# Patient Record
Sex: FEMALE | Race: WHITE | Employment: UNEMPLOYED | URBAN - METROPOLITAN AREA
[De-identification: names, ages, dates, MRNs, and addresses within clinical notes are randomized per-mention and may not be internally consistent; named-entity substitution may affect disease eponyms.]

---

## 2017-02-03 ENCOUNTER — OFFICE VISIT (OUTPATIENT)
Dept: OBGYN CLINIC | Age: 22
End: 2017-02-03

## 2017-02-03 NOTE — PROGRESS NOTES
A user error has taken place: encounter opened in error, closed for administrative reasons. Patient rescheduled appointment due to scheduling AE to early. Last AE done: 8/2016. Patient told to call insurance to ensure early exam would be covered.

## 2017-08-15 ENCOUNTER — OFFICE VISIT (OUTPATIENT)
Dept: OBGYN CLINIC | Age: 22
End: 2017-08-15

## 2017-08-15 VITALS
SYSTOLIC BLOOD PRESSURE: 116 MMHG | BODY MASS INDEX: 29.59 KG/M2 | HEIGHT: 63 IN | DIASTOLIC BLOOD PRESSURE: 74 MMHG | WEIGHT: 167 LBS

## 2017-08-15 DIAGNOSIS — N94.6 DYSMENORRHEA: ICD-10-CM

## 2017-08-15 DIAGNOSIS — Z20.2 STD EXPOSURE: ICD-10-CM

## 2017-08-15 DIAGNOSIS — Z01.411 ENCOUNTER FOR GYNECOLOGICAL EXAMINATION (GENERAL) (ROUTINE) WITH ABNORMAL FINDINGS: Primary | ICD-10-CM

## 2017-08-15 DIAGNOSIS — N92.6 IRREGULAR MENSES: ICD-10-CM

## 2017-08-15 RX ORDER — NORETHINDRONE ACETATE AND ETHINYL ESTRADIOL 1MG-20(21)
1 KIT ORAL DAILY
Qty: 3 PACKAGE | Refills: 4 | Status: SHIPPED | OUTPATIENT
Start: 2017-08-15 | End: 2018-08-16 | Stop reason: SDUPTHER

## 2017-08-15 NOTE — MR AVS SNAPSHOT
Visit Information Date & Time Provider Department Dept. Phone Encounter #  
 8/15/2017  9:30 AM Ritesh Ochoa MD [place] [phone] [id number] Upcoming Health Maintenance Date Due  
 HPV AGE 9Y-34Y (1 of 3 - Female 3 Dose Series) 3/10/2007 INFLUENZA AGE 9 TO ADULT 8/1/2017 PAP AKA CERVICAL CYTOLOGY 8/12/2018 Allergies as of 8/15/2017  Review Complete On: 8/15/2017 By: Fransico Lackey LPN No Known Allergies Current Immunizations  Never Reviewed No immunizations on file. Not reviewed this visit Vitals BP Height(growth percentile) Weight(growth percentile) LMP BMI OB Status 116/74 5' 3\" (1.6 m) 167 lb (75.8 kg) 07/26/2017 (Exact Date) 29.58 kg/m2 Having regular periods Smoking Status Never Smoker BMI and BSA Data Body Mass Index Body Surface Area  
 29.58 kg/m 2 1.84 m 2 Preferred Pharmacy Pharmacy Name Phone [facility] [phone] [facility] [phone] Your Updated Medication List  
  
   
This list is accurate as of: 8/15/17  9:56 AM.  Always use your most recent med list.  
  
  
  
  
 ISONIAZID PO Take  by mouth. * norethindrone-ethinyl estradiol 1 mg-20 mcg (21)/75 mg (7) Tab Commonly known as:  Beatris Ku FE 1/20 Take 1 Tab by mouth daily. * MICROGESTIN FE 1/20 (28) 1 mg-20 mcg (21)/75 mg (7) Tab Generic drug:  norethindrone-ethinyl estradiol TAKE 1 TABLET BY MOUTH EVERY DAY  
  
 * Notice: This list has 2 medication(s) that are the same as other medications prescribed for you. Read the directions carefully, and ask your doctor or other care provider to review them with you. Patient Instructions Well Visit, Ages 25 to 48: Care Instructions Your Care Instructions Physical exams can help you stay healthy.  Your doctor has checked your overall health and may have suggested ways to take good care of yourself. He or she also may have recommended tests. At home, you can help prevent illness with healthy eating, regular exercise, and other steps. Follow-up care is a key part of your treatment and safety. Be sure to make and go to all appointments, and call your doctor if you are having problems. It's also a good idea to know your test results and keep a list of the medicines you take. How can you care for yourself at home? · Reach and stay at a healthy weight. This will lower your risk for many problems, such as obesity, diabetes, heart disease, and high blood pressure. · Get at least 30 minutes of physical activity on most days of the week. Walking is a good choice. You also may want to do other activities, such as running, swimming, cycling, or playing tennis or team sports. Discuss any changes in your exercise program with your doctor. · Do not smoke or allow others to smoke around you. If you need help quitting, talk to your doctor about stop-smoking programs and medicines. These can increase your chances of quitting for good. · Talk to your doctor about whether you have any risk factors for sexually transmitted infections (STIs). Having one sex partner (who does not have STIs and does not have sex with anyone else) is a good way to avoid these infections. · Use birth control if you do not want to have children at this time. Talk with your doctor about the choices available and what might be best for you. · Protect your skin from too much sun. When you're outdoors from 10 a.m. to 4 p.m., stay in the shade or cover up with clothing and a hat with a wide brim. Wear sunglasses that block UV rays. Even when it's cloudy, put broad-spectrum sunscreen (SPF 30 or higher) on any exposed skin. · See a dentist one or two times a year for checkups and to have your teeth cleaned. · Wear a seat belt in the car. · Drink alcohol in moderation, if at all. That means no more than 2 drinks a day for men and 1 drink a day for women. Follow your doctor's advice about when to have certain tests. These tests can spot problems early. For everyone · Cholesterol. Have the fat (cholesterol) in your blood tested after age 21. Your doctor will tell you how often to have this done based on your age, family history, or other things that can increase your risk for heart disease. · Blood pressure. Have your blood pressure checked during a routine doctor visit. Your doctor will tell you how often to check your blood pressure based on your age, your blood pressure results, and other factors. · Vision. Talk with your doctor about how often to have a glaucoma test. 
· Diabetes. Ask your doctor whether you should have tests for diabetes. · Colon cancer. Have a test for colon cancer at age 48. You may have one of several tests. If you are younger than 48, you may need a test earlier if you have any risk factors. Risk factors include whether you already had a precancerous polyp removed from your colon or whether your parent, brother, sister, or child has had colon cancer. For women · Breast exam and mammogram. Talk to your doctor about when you should have a clinical breast exam and a mammogram. Medical experts differ on whether and how often women under 50 should have these tests. Your doctor can help you decide what is right for you. · Pap test and pelvic exam. Begin Pap tests at age 24. A Pap test is the best way to find cervical cancer. The test often is part of a pelvic exam. Ask how often to have this test. 
· Tests for sexually transmitted infections (STIs). Ask whether you should have tests for STIs. You may be at risk if you have sex with more than one person, especially if your partners do not wear condoms. For men · Tests for sexually transmitted infections (STIs).  Ask whether you should have tests for STIs. You may be at risk if you have sex with more than one person, especially if you do not wear a condom. · Testicular cancer exam. Ask your doctor whether you should check your testicles regularly. · Prostate exam. Talk to your doctor about whether you should have a blood test (called a PSA test) for prostate cancer. Experts differ on whether and when men should have this test. Some experts suggest it if you are older than 39 and are -American or have a father or brother who got prostate cancer when he was younger than 72. When should you call for help? Watch closely for changes in your health, and be sure to contact your doctor if you have any problems or symptoms that concern you. Where can you learn more? Go to http://marissa-annabel.info/. Enter P072 in the search box to learn more about \"Well Visit, Ages 25 to 48: Care Instructions. \" Current as of: July 19, 2016 Content Version: 11.3 © 6231-7686 [employer]. Care instructions adapted under license by [employer] (which disclaims liability or warranty for this information). If you have questions about a medical condition or this instruction, always ask your healthcare professional. [place] [address] any warranty or liability for your use of this information. Introducing [facility]! Dear Elayne Peguero: Thank you for requesting a [facility] account. Our records indicate that you already have an active [facility] account. You can access your account anytime at [email or web address] Did you know that you can access your hospital and ER discharge instructions at any time in [facility]? You can also review all of your test results from your hospital stay or ER visit. Additional Information If you have questions, please visit the Frequently Asked Questions section of the [facility] website at [email or web address] Remember, [employer] is NOT to be used for urgent needs. For medical emergencies, dial 911. Now available from your iPhone and Android! Please provide this summary of care documentation to your next provider. Your primary care clinician is listed as Christina Feliciano. If you have any questions after today's visit, please call [phone].

## 2017-08-16 LAB
HBV SURFACE AG SERPL QL IA: NEGATIVE
HIV 1+2 AB+HIV1 P24 AG SERPL QL IA: NON REACTIVE
RPR SER QL: NON REACTIVE

## 2017-08-18 LAB
C TRACH RRNA SPEC QL NAA+PROBE: NEGATIVE
N GONORRHOEA RRNA SPEC QL NAA+PROBE: NEGATIVE
T VAGINALIS RRNA SPEC QL NAA+PROBE: NEGATIVE

## 2017-08-19 LAB
CYTOLOGIST CVX/VAG CYTO: ABNORMAL
CYTOLOGY CVX/VAG DOC THIN PREP: ABNORMAL
DX ICD CODE: ABNORMAL
DX ICD CODE: ABNORMAL
HPV I/H RISK 1 DNA CVX QL PROBE+SIG AMP: NEGATIVE
LABCORP, 190119: ABNORMAL
Lab: ABNORMAL
OTHER STN SPEC: ABNORMAL
PATH REPORT.FINAL DX SPEC: ABNORMAL
PATHOLOGIST CVX/VAG CYTO: ABNORMAL
STAT OF ADQ CVX/VAG CYTO-IMP: ABNORMAL

## 2017-08-28 NOTE — PROGRESS NOTES
Pap smear results showed ASCUS with HR HPV negative. There were also changes suggestive of HSV: rec RTC for HSV swab and blood testing to confirm/evaluate. Please schedule. Update chart/last pap. Per ASCCP guidelines, recommend repeat pap with HR HPV 3 years. Tickle. Notify patient: some low level changes on pap (can be caused by inflammation, infection, hormonal changes, etc...) but low risks for cervical cancer so rec repeat 3 years as above. If pt is smoking, rec stop smoking to decrease risk of cervical cancer.

## 2017-08-28 NOTE — PROGRESS NOTES
Patient advised of MD reviewed lab work and recommendations. Patient reports no symptoms of HSV. Patient placed on the schedule for the follow up HSV swab and blood testing on 9/15/16 at 10:20am     Patient verbalized understanding.

## 2017-09-15 ENCOUNTER — OFFICE VISIT (OUTPATIENT)
Dept: OBGYN CLINIC | Age: 22
End: 2017-09-15

## 2017-09-15 VITALS
HEIGHT: 63 IN | WEIGHT: 168 LBS | BODY MASS INDEX: 29.77 KG/M2 | DIASTOLIC BLOOD PRESSURE: 74 MMHG | SYSTOLIC BLOOD PRESSURE: 118 MMHG

## 2017-09-15 DIAGNOSIS — L98.9 SKIN LESION: ICD-10-CM

## 2017-09-15 DIAGNOSIS — Z20.2 STD EXPOSURE: Primary | ICD-10-CM

## 2017-09-15 RX ORDER — VALACYCLOVIR HYDROCHLORIDE 1 G/1
TABLET, FILM COATED ORAL
Refills: 0 | COMMUNITY
Start: 2017-09-12 | End: 2018-08-16 | Stop reason: SDUPTHER

## 2017-09-15 NOTE — MR AVS SNAPSHOT
Visit Information Date & Time Provider Department Dept. Phone Encounter #  
 9/15/2017 10:20 AM Joselyn Kussmaul, MD [place] [phone] [id number] Your Appointments 8/16/2018 10:20 AM  
ESTABLISHED PATIENT with Joselyn Kussmaul, MD [place] ([address]) Appt Note: AE  
 [address] [facility] [id number]  
[facility] 31 [facility] Upcoming Health Maintenance Date Due  
 HPV AGE 9Y-34Y (1 of 3 - Female 3 Dose Series) 3/10/2007 INFLUENZA AGE 9 TO ADULT 8/1/2017 PAP AKA CERVICAL CYTOLOGY 8/15/2020 Allergies as of 9/15/2017  Review Complete On: 9/15/2017 By: Damaris Choudhury LPN No Known Allergies Current Immunizations  Never Reviewed No immunizations on file. Not reviewed this visit Vitals BP Height(growth percentile) Weight(growth percentile) LMP BMI OB Status 118/74 5' 3\" (1.6 m) 168 lb (76.2 kg) 08/21/2017 29.76 kg/m2 Having regular periods Smoking Status Never Smoker BMI and BSA Data Body Mass Index Body Surface Area  
 29.76 kg/m 2 1.84 m 2 Preferred Pharmacy Pharmacy Name Phone [address] [facility] AT [address] [place] [address]. [phone] Your Updated Medication List  
  
   
This list is accurate as of: 9/15/17 10:32 AM.  Always use your most recent med list.  
  
  
  
  
 ISONIAZID PO Take  by mouth. * norethindrone-ethinyl estradiol 1 mg-20 mcg (21)/75 mg (7) Tab Commonly known as:  Verdia Jaylen FE 1/20 Take 1 Tab by mouth daily. * norethindrone-ethinyl estradiol 1 mg-20 mcg (21)/75 mg (7) Tab Commonly known as:  MICROGESTIN FE 1/20 (28) Take 1 Tab by mouth daily. valACYclovir 1 gram tablet Commonly known as:  VALTREX TK 1 T PO BID  
  
 * Notice: This list has 2 medication(s) that are the same as other medications prescribed for you. Read the directions carefully, and ask your doctor or other care provider to review them with you. Patient Instructions Genital Herpes: Care Instructions Your Care Instructions Genital herpes is caused by a virus called herpes simplex. There are two types of this virus. Type 2 is the type that usually causes genital herpes. But type 1 can also cause it. Type 1 is the type that causes cold sores. Genital herpes is a sexually transmitted infection (STI). The most common way to get it is through sexual or other contact with someone who has herpes. For example, the virus can spread from a sore in the genital area to the lips. And it can spread from a cold sore on the lips to the genital area. Some people are surprised to find out that they have herpes or that they gave it to someone else. This is because a lot of people who have it don't know that they have it. They may not get sores or they may have sores that they cannot see. But the virus can still be spread by a person who does not have obvious sores or symptoms. There is no cure for herpes, but antiviral medicine can help you feel better and help prevent more outbreaks. This medicine may also lower the chance of spreading the virus. Follow-up care is a key part of your treatment and safety. Be sure to make and go to all appointments, and call your doctor if you are having problems. It's also a good idea to know your test results and keep a list of the medicines you take. How can you care for yourself at home? · Be safe with medicines. If your doctor prescribes medicine, take it exactly as prescribed. Call your doctor if you think you are having a problem with your medicine. You will get more details on the specific medicines your doctor prescribes. · To reduce the pain and itching from herpes sores: ¨ Wash the area with water 3 or 4 times a day. ¨ Keep the sores clean and dry in between baths or showers. You may want to let the sores air dry. This may feel better than a towel. ¨ Wear cotton underwear. Cotton absorbs moisture well. ¨ Take an over-the-counter pain medicine, such as acetaminophen (Tylenol), ibuprofen (Advil, Motrin), or naproxen (Aleve). Read and follow all instructions on the label. Do not take two or more pain medicines at the same time unless the doctor told you to. Many pain medicines have acetaminophen, which is Tylenol. Too much acetaminophen (Tylenol) can be harmful. To prevent the spread of genital herpes · Latex condoms are a good way to reduce the risk of spreading the virus. But you can still get the virus even if you use a condom. For the best protection, use condoms every time you have sex, from the beginning to the end of sexual contact. Remember that you can infect someone even if you do not have obvious symptoms or sores. · Avoid sexual contact when you have symptoms. Symptoms include sores, tingling, or pain. · Wash your hands if you touch a sore. In some cases, especially if this is your first herpes outbreak, you can spread the virus to other parts of your body or to other people. · Having one sex partner (who does not have STIs and does not have sex with anyone else) is a good way to avoid STIs. · Talk to your sex partner or partners about genital herpes. · Encourage your sex partners to get a blood test to see if they have been infected. When should you call for help? Call your doctor now or seek immediate medical care if: 
· You have a new fever. · There is increasing redness or red streaks around herpes sores. Watch closely for changes in your health, and be sure to contact your doctor if: 
· You have herpes and you think you might be pregnant. · You have an outbreak of herpes sores, and the sores are not healing. · You have frequent outbreaks of genital herpes sores. · You are unable to pass urine or are constipated. · You want to start antiviral medicine. · You do not get better as expected. Where can you learn more? Go to http://marissa-annabel.info/. Enter N494 in the search box to learn more about \"Genital Herpes: Care Instructions. \" Current as of: March 20, 2017 Content Version: 11.3 © 7885-3536 [employer]. Care instructions adapted under license by [employer] (which disclaims liability or warranty for this information). If you have questions about a medical condition or this instruction, always ask your healthcare professional. [address] any warranty or liability for your use of this information. Introducing [facility]! Dear Sea Fermin: Thank you for requesting a [facility] [employer] [facility] account. Our records indicate that you already have an active [employer] account. You can access your account anytime at [email or web address] Did you know that you can access your hospital and ER discharge instructions at any time in [employer]? You can also review all of your test results from your hospital stay or ER visit. Additional Information If you have questions, please visit the Frequently Asked Questions section of the [employer] website at [email or web address] Remember, [employer] is NOT to be used for urgent needs. For medical emergencies, dial 911. Now available from your iPhone and Android! Please provide this summary of care documentation to your next provider. Your primary care clinician is listed as Arti Hennessy. If you have any questions after today's visit, please call [phone].

## 2017-09-15 NOTE — PROGRESS NOTES
[address] [facility]  [email or web address]  [phone]    Louann Coelho MD, [address] [facility]       OB/GYN Problem visit    Chief Complaint:   Chief Complaint   Patient presents with    Sexually Transmitted Disease       History of Present Illness: This is a new problem being evaluated by this provider. The patient is a 25 y.o. 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 Patient started taking valtrex on 9/12/17. Patient has questions about where else she could have breakouts, why it started on her legs, and how she will know where the breakout will be next. She reports the symptoms are has slightly improved. Aggravating factors include none. Alleviating factors include Valtrex. No genital lesions. She does not have other concerns. LMP: Patient's last menstrual period was 08/21/2017. PFSH:  Past Medical History:   Diagnosis Date    Abnormal Pap smear of cervix 08/18/2017    ASCUS, hpv neg w/ suggestive changes of HSV- rp 3 yr    Chlamydia 08/2016    PHILIP was negative 12/2016    Other specified vaccination     Gardasil 3/3 received    Patellar malalignment syndrome     Positive TB test      Past Surgical History:   Procedure Laterality Date    HX WISDOM TEETH EXTRACTION       Family History   Problem Relation Age of Onset    Hypertension Mother     Glaucoma Father      Social History   Substance Use Topics    Smoking status: Never Smoker    Smokeless tobacco: Never Used    Alcohol use No     No Known Allergies  Current Outpatient Prescriptions   Medication Sig    valACYclovir (VALTREX) 1 gram tablet TK 1 T PO BID    norethindrone-ethinyl estradiol (MICROGESTIN FE 1/20, 28,) 1 mg-20 mcg (21)/75 mg (7) tab Take 1 Tab by mouth daily.  norethindrone-ethinyl estradiol (JUNEL FE 1/20) 1 mg-20 mcg (21)/75 mg (7) tab Take 1 Tab by mouth daily.  ISONIAZID PO Take  by mouth. No current facility-administered medications for this visit. Review of Systems:  History obtained from the patient  Constitutional: negative for fevers, chills and weight loss  ENT ROS: negative for - hearing change, oral lesions or visual changes  Respiratory: negative for cough, wheezing or dyspnea on exertion  Cardiovascular: negative for chest pain, irregular heart beats, exertional chest pressure/discomfort  Gastrointestinal: negative for dysphagia, nausea and vomiting  Genito-Urinary ROS:  see HPI  Inteument/breast: negative for rash, breast lump and nipple discharge  Musculoskeletal:negative for stiff joints, neck pain and muscle weakness  Endocrine ROS: negative for - breast changes, galactorrhea or temperature intolerance  Hematological and Lymphatic ROS: negative for - blood clots, bruising or swollen lymph nodes    Physical Exam:  Visit Vitals    /74    Ht 5' 3\" (1.6 m)    Wt 168 lb (76.2 kg)    BMI 29.76 kg/m2       GENERAL: alert, well appearing, and in no distress  HEAD: normocephalic, atraumatic. \\   ABDOMEN: soft, nontender, nondistended, no masses or organomegaly   EGBUS: no lesions, no inflammation, no masses  VULVA: normal appearing vulva with no masses, tenderness or lesions  VAGINA: normal appearing vagina with normal color, no lesions, white discharge  CERVIX: normal appearing cervix without discharge or lesions, non tender  UTERUS: uterus is normal size, shape, consistency and nontender   ADNEXA: normal adnexa in size, nontender and no masses  NEURO: alert, oriented, normal speech    Assessment:  Encounter Diagnoses   Name Primary?  STD exposure Yes    Skin lesion        Plan:  The patient is advised that she should contact the office if she does not note improvement or if symptoms recur  Recommend follow up with PCP for non-gynecologic complaints and chronic medical problems.     She should contact our office with any questions or concerns  She could keep her routine annual exam appointment. Reviewed recent serum and swab STD screening  Discussed safer sex practices, condom use and risk factors for sexually transmitted diseases. Obtain HSV swab results if possible, pt will forward to office  RTC for any genital sores: can do HSV testing if needed    No orders of the defined types were placed in this encounter. No results found for this visit on 09/15/17.

## 2017-09-15 NOTE — PATIENT INSTRUCTIONS
Genital Herpes: Care Instructions  Your Care Instructions  Genital herpes is caused by a virus called herpes simplex. There are two types of this virus. Type 2 is the type that usually causes genital herpes. But type 1 can also cause it. Type 1 is the type that causes cold sores. Genital herpes is a sexually transmitted infection (STI). The most common way to get it is through sexual or other contact with someone who has herpes. For example, the virus can spread from a sore in the genital area to the lips. And it can spread from a cold sore on the lips to the genital area. Some people are surprised to find out that they have herpes or that they gave it to someone else. This is because a lot of people who have it don't know that they have it. They may not get sores or they may have sores that they cannot see. But the virus can still be spread by a person who does not have obvious sores or symptoms. There is no cure for herpes, but antiviral medicine can help you feel better and help prevent more outbreaks. This medicine may also lower the chance of spreading the virus. Follow-up care is a key part of your treatment and safety. Be sure to make and go to all appointments, and call your doctor if you are having problems. It's also a good idea to know your test results and keep a list of the medicines you take. How can you care for yourself at home? · Be safe with medicines. If your doctor prescribes medicine, take it exactly as prescribed. Call your doctor if you think you are having a problem with your medicine. You will get more details on the specific medicines your doctor prescribes. · To reduce the pain and itching from herpes sores:  ¨ Wash the area with water 3 or 4 times a day. ¨ Keep the sores clean and dry in between baths or showers. You may want to let the sores air dry. This may feel better than a towel. ¨ Wear cotton underwear. Cotton absorbs moisture well.   ¨ Take an over-the-counter pain medicine, such as acetaminophen (Tylenol), ibuprofen (Advil, Motrin), or naproxen (Aleve). Read and follow all instructions on the label. Do not take two or more pain medicines at the same time unless the doctor told you to. Many pain medicines have acetaminophen, which is Tylenol. Too much acetaminophen (Tylenol) can be harmful. To prevent the spread of genital herpes  · Latex condoms are a good way to reduce the risk of spreading the virus. But you can still get the virus even if you use a condom. For the best protection, use condoms every time you have sex, from the beginning to the end of sexual contact. Remember that you can infect someone even if you do not have obvious symptoms or sores. · Avoid sexual contact when you have symptoms. Symptoms include sores, tingling, or pain. · Wash your hands if you touch a sore. In some cases, especially if this is your first herpes outbreak, you can spread the virus to other parts of your body or to other people. · Having one sex partner (who does not have STIs and does not have sex with anyone else) is a good way to avoid STIs. · Talk to your sex partner or partners about genital herpes. · Encourage your sex partners to get a blood test to see if they have been infected. When should you call for help? Call your doctor now or seek immediate medical care if:  · You have a new fever. · There is increasing redness or red streaks around herpes sores. Watch closely for changes in your health, and be sure to contact your doctor if:  · You have herpes and you think you might be pregnant. · You have an outbreak of herpes sores, and the sores are not healing. · You have frequent outbreaks of genital herpes sores. · You are unable to pass urine or are constipated. · You want to start antiviral medicine. · You do not get better as expected. Where can you learn more? Go to http://marissa-annabel.info/.   Enter W529 in the search box to learn more about \"Genital Herpes: Care Instructions. \"  Current as of: March 20, 2017  Content Version: 11.3  © 6204-6672 [employer]. Care instructions adapted under license by [employer] (which disclaims liability or warranty for this information). If you have questions about a medical condition or this instruction, always ask your healthcare professional. [address] any warranty or liability for your use of this information.

## 2017-12-21 ENCOUNTER — TELEPHONE (OUTPATIENT)
Dept: OBGYN CLINIC | Age: 22
End: 2017-12-21

## 2017-12-21 RX ORDER — VALACYCLOVIR HYDROCHLORIDE 500 MG/1
500 TABLET, FILM COATED ORAL 2 TIMES DAILY
Qty: 6 TAB | Refills: 3 | Status: SHIPPED | OUTPATIENT
Start: 2017-12-21 | End: 2017-12-24

## 2017-12-21 NOTE — TELEPHONE ENCOUNTER
Patient is a [facility] patient,  25 y.o that tested positive for genital herpes at [facility] 3 months ago (approximately). Patient has a new vaginal outbreak and is requesting for us to fill her rx for Valacyclovir. Please advise.     [address]    Last AE 8/15/17

## 2017-12-21 NOTE — TELEPHONE ENCOUNTER
Spoke with patient and advised rx has been called to pharmacy since escribe is down. Patient went to [facility] in [address] [facility]. She will call to get their address information and fax info and notify us if she needs a release of records form to get us these records.

## 2018-08-16 ENCOUNTER — OFFICE VISIT (OUTPATIENT)
Dept: OBGYN CLINIC | Age: 23
End: 2018-08-16

## 2018-08-16 VITALS
DIASTOLIC BLOOD PRESSURE: 84 MMHG | SYSTOLIC BLOOD PRESSURE: 112 MMHG | WEIGHT: 174.6 LBS | HEIGHT: 63 IN | BODY MASS INDEX: 30.94 KG/M2

## 2018-08-16 DIAGNOSIS — Z01.419 WELL WOMAN EXAM WITH ROUTINE GYNECOLOGICAL EXAM: Primary | ICD-10-CM

## 2018-08-16 DIAGNOSIS — A60.00 GENITAL HERPES SIMPLEX, UNSPECIFIED SITE: ICD-10-CM

## 2018-08-16 DIAGNOSIS — N92.6 IRREGULAR MENSES: ICD-10-CM

## 2018-08-16 RX ORDER — NORETHINDRONE ACETATE AND ETHINYL ESTRADIOL 1MG-20(21)
1 KIT ORAL DAILY
Qty: 3 PACKAGE | Refills: 4 | Status: SHIPPED | OUTPATIENT
Start: 2018-08-16

## 2018-08-16 RX ORDER — VALACYCLOVIR HYDROCHLORIDE 1 G/1
TABLET, FILM COATED ORAL
Qty: 10 TAB | Refills: 5 | Status: SHIPPED | OUTPATIENT
Start: 2018-08-16 | End: 2019-09-05 | Stop reason: SDUPTHER

## 2018-08-16 NOTE — MR AVS SNAPSHOT
[address] [facility] 305 1007 [facility] 
[phone] Patient: Tor Armstrong MRN: [id number] :1995 Visit Information Date & Time Provider Department Dept. Phone Encounter #  
 2018 10:20 AM Jovita Rehman MD [facility] [phone] [id number] Upcoming Health Maintenance Date Due  
 HPV Age 9Y-34Y (1 of 1 - Female 3 Dose Series) 3/10/2007 Influenza Age 5 to Adult 2018 PAP AKA CERVICAL CYTOLOGY 8/15/2020 Allergies as of 2018  Review Complete On: 2018 By: Farideh Rainey No Known Allergies Current Immunizations  Never Reviewed No immunizations on file. Not reviewed this visit You Were Diagnosed With   
  
 Codes Comments Well woman exam with routine gynecological exam    -  Primary ICD-10-CM: E77.478 ICD-9-CM: V72.31 Irregular menses     ICD-10-CM: N92.6 ICD-9-CM: 626.4 not taking OCP consistently Vitals BP Height(growth percentile) Weight(growth percentile) LMP  
 112/84 (BP 1 Location: Left arm, BP Patient Position: Sitting) 5' 3\" (1.6 m) 174 lb 9.6 oz (79.2 kg) 2018 (Approximate) BMI OB Status Smoking Status 30.93 kg/m2 Having regular periods Never Smoker BMI and BSA Data Body Mass Index Body Surface Area 30.93 kg/m 2 1.88 m 2 Preferred Pharmacy Pharmacy Name Phone [address] [facility] AT [address] [place] [address]. [phone] Your Updated Medication List  
  
   
This list is accurate as of 18 10:40 AM.  Always use your most recent med list.  
  
  
  
  
 ISONIAZID PO Take  by mouth. * norethindrone-ethinyl estradiol 1 mg-20 mcg (21)/75 mg (7) Tab Commonly known as:  Soha Hi FE  Take 1 Tab by mouth daily. * norethindrone-ethinyl estradiol 1 mg-20 mcg (21)/75 mg (7) Tab Commonly known as:  MICROGESTIN FE  (28) Take 1 Tab by mouth daily. valACYclovir 1 gram tablet Commonly known as:  VALTREX TK 1 T PO BID * Notice: This list has 2 medication(s) that are the same as other medications prescribed for you. Read the directions carefully, and ask your doctor or other care provider to review them with you. Patient Instructions Well Visit, Ages 25 to 48: Care Instructions Your Care Instructions Physical exams can help you stay healthy. Your doctor has checked your overall health and may have suggested ways to take good care of yourself. He or she also may have recommended tests. At home, you can help prevent illness with healthy eating, regular exercise, and other steps. Follow-up care is a key part of your treatment and safety. Be sure to make and go to all appointments, and call your doctor if you are having problems. It's also a good idea to know your test results and keep a list of the medicines you take. How can you care for yourself at home? · Reach and stay at a healthy weight. This will lower your risk for many problems, such as obesity, diabetes, heart disease, and high blood pressure. · Get at least 30 minutes of physical activity on most days of the week. Walking is a good choice. You also may want to do other activities, such as running, swimming, cycling, or playing tennis or team sports. Discuss any changes in your exercise program with your doctor. · Do not smoke or allow others to smoke around you. If you need help quitting, talk to your doctor about stop-smoking programs and medicines. These can increase your chances of quitting for good. · Talk to your doctor about whether you have any risk factors for sexually transmitted infections (STIs). Having one sex partner (who does not have STIs and does not have sex with anyone else) is a good way to avoid these infections. · Use birth control if you do not want to have children at this time.  Talk with your doctor about the choices available and what might be best for you. · Protect your skin from too much sun. When you're outdoors from 10 a.m. to 4 p.m., stay in the shade or cover up with clothing and a hat with a wide brim. Wear sunglasses that block UV rays. Even when it's cloudy, put broad-spectrum sunscreen (SPF 30 or higher) on any exposed skin. · See a dentist one or two times a year for checkups and to have your teeth cleaned. · Wear a seat belt in the car. · Drink alcohol in moderation, if at all. That means no more than 2 drinks a day for men and 1 drink a day for women. Follow your doctor's advice about when to have certain tests. These tests can spot problems early. For everyone · Cholesterol. Have the fat (cholesterol) in your blood tested after age 21. Your doctor will tell you how often to have this done based on your age, family history, or other things that can increase your risk for heart disease. · Blood pressure. Have your blood pressure checked during a routine doctor visit. Your doctor will tell you how often to check your blood pressure based on your age, your blood pressure results, and other factors. · Vision. Talk with your doctor about how often to have a glaucoma test. 
· Diabetes. Ask your doctor whether you should have tests for diabetes. · Colon cancer. Have a test for colon cancer at age 48. You may have one of several tests. If you are younger than 48, you may need a test earlier if you have any risk factors. Risk factors include whether you already had a precancerous polyp removed from your colon or whether your parent, brother, sister, or child has had colon cancer. For women · Breast exam and mammogram. Talk to your doctor about when you should have a clinical breast exam and a mammogram. Medical experts differ on whether and how often women under 50 should have these tests. Your doctor can help you decide what is right for you. · Pap test and pelvic exam. Begin Pap tests at age 24. A Pap test is the best way to find cervical cancer. The test often is part of a pelvic exam. Ask how often to have this test. 
· Tests for sexually transmitted infections (STIs). Ask whether you should have tests for STIs. You may be at risk if you have sex with more than one person, especially if your partners do not wear condoms. For men · Tests for sexually transmitted infections (STIs). Ask whether you should have tests for STIs. You may be at risk if you have sex with more than one person, especially if you do not wear a condom. · Testicular cancer exam. Ask your doctor whether you should check your testicles regularly. · Prostate exam. Talk to your doctor about whether you should have a blood test (called a PSA test) for prostate cancer. Experts differ on whether and when men should have this test. Some experts suggest it if you are older than 39 and are -American or have a father or brother who got prostate cancer when he was younger than 72. When should you call for help? Watch closely for changes in your health, and be sure to contact your doctor if you have any problems or symptoms that concern you. Where can you learn more? Go to http://marissa-annabel.info/. Enter P072 in the search box to learn more about \"Well Visit, Ages 25 to 48: Care Instructions. \" Current as of: May 16, 2017 Content Version: 11.7 © 2113-5801 [employer]. Care instructions adapted under license by [employer] (which disclaims liability or warranty for this information). If you have questions about a medical condition or this instruction, always ask your healthcare professional. [place] [address] any warranty or liability for your use of this information. Introducing [facility]! Dear Yuval Hernandez: Thank you for requesting a [employer] account.   Our records indicate that you already have an active [employer] account. You can access your account anytime at [email or web address]/"[email or web address] Did you know that you can access your hospital and ER discharge instructions at any time in Mpayy? You can also review all of your test results from your hospital stay or ER visit. Additional Information If you have questions, please visit the Frequently Asked Questions section of the M[employer]y website at [email or web address] Remember, Mpayy is NOT to be used for urgent needs. For medical emergencies, dial 911. Now available from your iPhone and Android! Please provide this summary of care documentation to your next provider. Your primary care clinician is listed as Sade Nevarez. If you have any questions after today's visit, please call [phone].

## 2018-08-16 NOTE — PROGRESS NOTES
[facility]  [email or web address]/  [phone]    Brandi Davila MD, FACOG       Annual Gynecologic Exam:  WWE <40  Chief Complaint   Patient presents with    Well Woman         Natalia Calderon is a 26 y.o.  WHITE OR  female who presents for an annual well woman exam.  Patient's last menstrual period was 2018 (approximate). .    With regard to the Gardisil vaccine, she has received all 3 injections. She does not report additional concerns today. She reports more HSV sx genital and on leg. Little improvement with valtrex. Menstrual status:  Her periods are moderate. She does not report dysmenorrhea/painful menses. She does not report irregular bleeding. Sexual history and Contraception:  History   Sexual Activity    Sexual activity: Yes    Partners: Male    Birth control/ protection: Pill     She most of the time use condoms with sexual activity  She does reports new sexual partner(s) in the last year. The patient does not request STD testing. We recommended testing per CDC guidelines and at patient request.     Preventive Medicine History:  Her most recent Pap smear result: ASCUS was obtained in 2017  Her most recent HR HPV screen was Negative obtained in 2017  She does not have a history of JAME 2, 3 or cervical cancer.      Past Medical History:   Diagnosis Date    Abnormal Pap smear of cervix 2017    ASCUS, hpv neg w/ suggestive changes of HSV- rp 3 yr    Chlamydia 2016    PHILIP was negative 2016    Genital herpes     dxd at [facility]    Other specified vaccination     Gardasil 3/3 received    Patellar malalignment syndrome     Positive TB test      OB History    Para Term  AB Living   0 0 0 0 0 0   SAB TAB Ectopic Molar Multiple Live Births   0 0 0  0            Past Surgical History:   Procedure Laterality Date    HX WISDOM TEETH EXTRACTION       Family History   Problem Relation Age of Onset    Hypertension Mother     Glaucoma Father      Social History     Social History    Marital status: SINGLE     Spouse name: N/A    Number of children: N/A    Years of education: N/A     Occupational History    Not on file. Social History Main Topics    Smoking status: Never Smoker    Smokeless tobacco: Never Used    Alcohol use Yes    Drug use: No    Sexual activity: Yes     Partners: Male     Birth control/ protection: Pill     Other Topics Concern    Not on file     Social History Narrative       No Known Allergies    Current Outpatient Prescriptions   Medication Sig    valACYclovir (VALTREX) 1 gram tablet TK 1 T PO BID x5 days prn    norethindrone-ethinyl estradiol (MICROGESTIN FE 1/20, 28,) 1 mg-20 mcg (21)/75 mg (7) tab Take 1 Tab by mouth daily.  norethindrone-ethinyl estradiol (JUNEL FE 1/20) 1 mg-20 mcg (21)/75 mg (7) tab Take 1 Tab by mouth daily.  ISONIAZID PO Take  by mouth. No current facility-administered medications for this visit.         Patient Active Problem List   Diagnosis Code    Dysmenorrhea N94.6    Menorrhagia N92.0       Review of Systems - History obtained from the patient  Constitutional: negative for weight loss, fever, night sweats  HEENT: negative for hearing loss, earache, congestion, snoring, sorethroat  CV: negative for chest pain, palpitations, edema  Resp: negative for cough, shortness of breath, wheezing  GI: negative for change in bowel habits, abdominal pain, black or bloody stools  : negative for frequency, dysuria, hematuria  GYN: see HPI  MSK: negative for back pain, joint pain, muscle pain  Breast: negative for breast lumps, nipple discharge, galactorrhea  Skin :negative for itching, rash, hives  Neuro: negative for dizziness, headache, confusion, weakness  Psych: negative for anxiety, depression, change in mood  Heme/lymph: negative for bleeding, bruising, pallor    Physical Exam  Visit Vitals    /84 (BP 1 Location: Left arm, BP Patient Position: Sitting)    Ht 5' 3\" (1.6 m)    Wt 174 lb 9.6 oz (79.2 kg)    LMP 08/03/2018 (Approximate)    BMI 30.93 kg/m2       Constitutional  · Appearance: well-nourished, well developed, alert, in no acute distress    HENT  · Head and Face: appears normal    Neck  · Inspection/Palpation: normal appearance, no masses or tenderness  · Lymph Nodes: no lymphadenopathy present  · Thyroid: gland size normal, nontender, no nodules or masses present on palpation    Chest  · Respiratory Effort: breathing unlabored  · Auscultation: normal breath sounds    Cardiovascular  · Heart:  · Auscultation: regular rate and rhythm without murmur    Breasts  · Inspection of Breasts: breasts symmetrical, no skin changes, no discharge present, nipple appearance normal, no skin retraction present  · Palpation of Breasts and Axillae: no masses present on palpation, no breast tenderness  · Axillary Lymph Nodes: no lymphadenopathy present    Gastrointestinal  · Abdominal Examination: abdomen non-tender to palpation, normal bowel sounds, no masses present  · Liver and spleen: no hepatomegaly present, spleen not palpable  · Hernias: no hernias identified    Genitourinary  · External Genitalia: normal appearance for age, no discharge present, no tenderness present, no inflammatory lesions present, no masses present  · Vagina: normal vaginal vault without central or paravaginal defects, no discharge present, no inflammatory lesions present, no masses present  · Bladder: non-tender to palpation  · Urethra: appears normal  · Cervix: normal   · Uterus: normal size, shape and consistency  · Adnexa: no adnexal tenderness present, no adnexal masses present  · Perineum: perineum within normal limits, no evidence of trauma, no rashes or skin lesions present  · Anus: anus within normal limits, no hemorrhoids present  · Inguinal Lymph Nodes: no lymphadenopathy present    Skin  · General Inspection: no rash, no lesions identified    Neurologic/Psychiatric  · Mental Status:  · Orientation: grossly oriented to person, place and time  · Mood and Affect: mood normal, affect appropriate    Assessment:  26 y.o.  for well woman exam  Encounter Diagnoses   Name Primary?  Well woman exam with routine gynecological exam Yes    Irregular menses     Genital herpes simplex, unspecified site        Plan:  The patient was counseled about diet, exercise, healthy lifestyle  We discussed self breast exam  We discussed safer sex practices, condom use and risk factors for sexually transmitted diseases. We discussed current pap smear and HR HPV testing guidelines. We recommend follow up one year for routine annual gynecologic exam or sooner prn  We recommend routine follow up with her primary care doctor for management of chronic medical problems and non-gynecologic concerns  Handouts were given to the patient  We discussed calcium/vitamin D/weight bearing exercise and osteoporosis prevention  Inc valtrex 1000 mg bid x 5d for out breaks to see if helps with out breaks  Discussed risks, benefits and alternatives of OCP/nuvaring/patch: including but not limited to dvt/pe/mi/cva/ca/gi risks. Folllow up:  [x] return for annual well woman exam in one year or sooner if she is having problems  [] follow up and ultrasound  [] 6 months  [] 3 months  [] 6 weeks   [] 1 month    Orders Placed This Encounter    CHLAMYDIA/GC PCR    valACYclovir (VALTREX) 1 gram tablet    norethindrone-ethinyl estradiol (MICROGESTIN FE , ,) 1 mg-20 mcg (21)/75 mg (7) tab       No results found for any visits on 18.

## 2018-08-16 NOTE — PATIENT INSTRUCTIONS

## 2018-08-18 LAB
C TRACH RRNA SPEC QL NAA+PROBE: NEGATIVE
N GONORRHOEA RRNA SPEC QL NAA+PROBE: NEGATIVE

## 2019-09-04 ENCOUNTER — TELEPHONE (OUTPATIENT)
Dept: OBGYN CLINIC | Age: 24
End: 2019-09-04

## 2019-09-04 NOTE — TELEPHONE ENCOUNTER
Patient calling stating that she is currently having an HSV outbreak and needs a refill of her 1 gram valtrex. Patient states she moved over the summer to [place] and has not established with a gyn MD there but hopes of getting a refill until she can get in with a doctor there. Last AE 8/16/2018    Please advise.

## 2019-09-05 RX ORDER — VALACYCLOVIR HYDROCHLORIDE 1 G/1
TABLET, FILM COATED ORAL
Qty: 20 TAB | Refills: 0 | Status: SHIPPED | OUTPATIENT
Start: 2019-09-05

## 2020-11-06 NOTE — TELEPHONE ENCOUNTER
Rx: valtrex 500mg po q12 hrs x 3d  RF x3 if ae up to date. Ok for RF until AE due.   Pls obtain [facility] records (?culture? hsv1 or2?) no